# Patient Record
Sex: MALE
[De-identification: names, ages, dates, MRNs, and addresses within clinical notes are randomized per-mention and may not be internally consistent; named-entity substitution may affect disease eponyms.]

---

## 2022-02-28 PROBLEM — Z00.00 ENCOUNTER FOR PREVENTIVE HEALTH EXAMINATION: Status: ACTIVE | Noted: 2022-02-28

## 2022-03-08 RX ORDER — UBIDECARENONE/VIT E ACET 100MG-5
CAPSULE ORAL
Refills: 0 | Status: ACTIVE | COMMUNITY
Start: 2022-03-08

## 2022-03-09 ENCOUNTER — APPOINTMENT (OUTPATIENT)
Dept: BARIATRICS/WEIGHT MGMT | Facility: CLINIC | Age: 32
End: 2022-03-09
Payer: SELF-PAY

## 2022-03-09 VITALS — HEIGHT: 72 IN | WEIGHT: 295 LBS | BODY MASS INDEX: 39.96 KG/M2

## 2022-03-09 DIAGNOSIS — Z83.3 FAMILY HISTORY OF DIABETES MELLITUS: ICD-10-CM

## 2022-03-09 DIAGNOSIS — Z87.2 PERSONAL HISTORY OF DISEASES OF THE SKIN AND SUBCUTANEOUS TISSUE: ICD-10-CM

## 2022-03-09 DIAGNOSIS — Z83.438 FAMILY HISTORY OF OTHER DISORDER OF LIPOPROTEIN METABOLISM AND OTHER LIPIDEMIA: ICD-10-CM

## 2022-03-09 DIAGNOSIS — Z82.49 FAMILY HISTORY OF ISCHEMIC HEART DISEASE AND OTHER DISEASES OF THE CIRCULATORY SYSTEM: ICD-10-CM

## 2022-03-09 PROCEDURE — 99205 OFFICE O/P NEW HI 60 MIN: CPT | Mod: 95

## 2022-04-04 ENCOUNTER — APPOINTMENT (OUTPATIENT)
Dept: BARIATRICS/WEIGHT MGMT | Facility: CLINIC | Age: 32
End: 2022-04-04
Payer: COMMERCIAL

## 2022-04-04 PROCEDURE — 99214 OFFICE O/P EST MOD 30 MIN: CPT | Mod: 95

## 2022-04-04 NOTE — REASON FOR VISIT
[Follow-Up Visit] : a follow-up visit for [Hyperlipidemia] : hyperlipidemia [Hypertension] : hypertension [Obesity] : obesity

## 2022-06-14 ENCOUNTER — NON-APPOINTMENT (OUTPATIENT)
Age: 32
End: 2022-06-14

## 2022-08-29 ENCOUNTER — APPOINTMENT (OUTPATIENT)
Dept: BARIATRICS/WEIGHT MGMT | Facility: CLINIC | Age: 32
End: 2022-08-29

## 2022-08-29 VITALS — WEIGHT: 256 LBS | BODY MASS INDEX: 34.67 KG/M2 | HEIGHT: 72 IN

## 2022-08-29 DIAGNOSIS — E66.9 OBESITY, UNSPECIFIED: ICD-10-CM

## 2022-08-29 PROCEDURE — 99213 OFFICE O/P EST LOW 20 MIN: CPT | Mod: 95

## 2022-08-29 RX ORDER — SEMAGLUTIDE 1.34 MG/ML
2 INJECTION, SOLUTION SUBCUTANEOUS
Qty: 2 | Refills: 0 | Status: COMPLETED | COMMUNITY
Start: 2022-06-27

## 2022-08-29 RX ORDER — SEMAGLUTIDE 1 MG/.5ML
1 INJECTION, SOLUTION SUBCUTANEOUS
Qty: 2 | Refills: 0 | Status: DISCONTINUED | COMMUNITY
Start: 2022-04-27

## 2022-08-29 RX ORDER — SEMAGLUTIDE 1.7 MG/.75ML
1.7 INJECTION, SOLUTION SUBCUTANEOUS
Qty: 3 | Refills: 0 | Status: DISCONTINUED | COMMUNITY
Start: 2022-05-11

## 2022-08-29 RX ORDER — SEMAGLUTIDE 0.5 MG/.5ML
0.5 INJECTION, SOLUTION SUBCUTANEOUS
Qty: 1 | Refills: 0 | Status: DISCONTINUED | COMMUNITY
Start: 2022-03-09 | End: 2022-08-29

## 2022-08-29 RX ORDER — SEMAGLUTIDE 0.25 MG/.5ML
0.25 INJECTION, SOLUTION SUBCUTANEOUS
Qty: 2 | Refills: 0 | Status: DISCONTINUED | COMMUNITY
Start: 2022-03-09

## 2022-08-29 NOTE — PHYSICAL EXAM
[Obese, well nourished, in no acute distress] : obese, well nourished, in no acute distress [de-identified] : TEB appointment

## 2022-08-29 NOTE — HISTORY OF PRESENT ILLNESS
[Home] : at home, [unfilled] , at the time of the visit. [Medical Office: ([facility])___] : at the medical office located in  [Verbal consent obtained from patient] : the patient, [unfilled] [FreeTextEntry1] : 31M PMH class I obesity, HTN, HLD, eczema who presents to weight management for initial evaluation.\par \par He presents referred by his wife, who also started in the clinic recently for weight loss.\par \par Weight/Diet History:\par Notes he was always heavy as a kid. About 10 years ago he decided to try and lose lots of weight by exercising a lot and going to the gym. He was doing aerobic and strength training. Estimates he was 90 kg (198.4 lbs) at that time. He regained weight when he stopped exercising. \par He is currently at his highest weight.\par \par Weight today: 287 lbs, BMI 38.92\par \par Diet: Wakes up at 6:30 am\par B: Whole grain bread with hummus\par L: Chicken with rice, green beans\par D: Sometimes skips. Last night he had jorge side up egg and salad and pastrami\par Dessert: Popsicles\par Snack: AM - banana, liquid yogurt. Chips and popsicles in the evening, perfect bar\par Beverages: Lao coffee with sugar\par Night-time eating: Chips and popsicles\par Fast-food/Restaurants: Minimal\par Cook/Prepare meals:\par Added oils:\par Food Journal: No\par \par Exercise: None at them moment. Active on the job.\par \par Sleep: Snoring, no sleep study. Less when he weighed at 6:30. Goes to bed at 10 pm, and wakes up 6:30 pm\par \par Social Hx: Denies tobacco or drug use. Drinks wine once per week and one beer several nights per week. Works in construction management, [employer]. Lives with wife and infant daughter. \par \par Denies hx of cardiac conditions, GERD, pancreatitis, gallstones, kidney stones, glaucoma, fhx thyroid cancer, or clinical anxiety/depression.

## 2022-08-29 NOTE — ASSESSMENT
[FreeTextEntry1] : 31M PMH obesity, HTN, HLD, eczema who presents to weight management for initial evaluation.\par \par # Obesity c/HTN, HLD: Weight today 287 lbs, BMI 38.92. Longstanding history of weight challenges, lost weight with exercise in the past but regained with sedentary lifestyle.\par - Food log recommended\par - Minimize snacks, particularly after dinner (popsicles, chips), and liquid calories\par - Will further discuss meal timing and whole foods\par - Start regular exercise.\par - Snores, will consider eval for MONIK, although reportedly improved with weight loss. \par - Will discuss beer/alcohol at next visit\par - Wegovy .25 mg/wk prescribed\par - F/u 2-4 weeks\par - Labs and exam at future visit.

## 2022-08-29 NOTE — ASSESSMENT
[FreeTextEntry1] : 31M PMH class I obesity, HTN, HLD, eczema who presents to weight management for initial evaluation.\par \par # Obesity c/b HTN, HLD: Reporting ~9 lb weight loss over the past 3-4 weeks on Wegovy 0.25 mg/wk, with significant improvements to eating habits such as night eating. \par - Rec starting food log\par - C/w improved eating habits\par - Incorporate exercise or activity into day\par - Increase Wegovy to 0.5 mg/wk\par - Follow-up in 4 weeks.

## 2022-08-29 NOTE — HISTORY OF PRESENT ILLNESS
[FreeTextEntry1] : 32M PMH class I obesity, HTN, HLD, eczema who presents to weight management for follow-up.\par \par Weight Today: 256, BMI 34.72\par Weight at Initial Visit (3/9/22): 287 lbs, BMI 38.92 \par \par Medication: He has been on Ozempic 1.0 mg/wk for 2 weeks. No side effects at all.\par Previously he was on Wegovy and at 1.7 mg/wk he experienced severe abdominal pain, went to the ED for imaging and evaluation, nothing was found, spontaneously resolved. We reduced medication to 0.5 mg/wk and monitored, he continued to be asymptomatic since then. Recently increased to 1.0 mg/wk and remains comfortable with no issues. \par \par He feels he is losing lots of weight, and is buying new clothes.\par He has cut out "junk food". Has an apple at breakfast and banana in the evening to get fruit, has salads for vegetables, chicken in the evening for protein. He is not eating after 8 pm. \par Exercises regularly at [employer].

## 2022-08-29 NOTE — PHYSICAL EXAM
[Obese, well nourished, in no acute distress] : obese, well nourished, in no acute distress [Normal] : affect appropriate [de-identified] : TEB appointment

## 2022-08-29 NOTE — HISTORY OF PRESENT ILLNESS
[Home] : at home, [unfilled] , at the time of the visit. [Medical Office: ([facility])___] : at the medical office located in  [Verbal consent obtained from patient] : the patient, [unfilled] [FreeTextEntry1] : 31M PMH class I obesity, HTN, HLD, eczema who presents to weight management for initial evaluation.\par \par Reports weight loss to 280 lbs\par Weight at Initial Visit (3/9/22): 287 lbs, BMI 38.92\par \par Started on Wegovy 0.25 mg at least visit, just took his fourth dose. Tolerating med without side effect or any issue. \par Notes significant reduction in appetite. Has cut out eating in the evening such as when he watches TV.\par Eats very little late in the day, most of what he eats is in the morning. \par Has not started exercising yet.

## 2022-08-29 NOTE — PHYSICAL EXAM
[Obese, well nourished, in no acute distress] : obese, well nourished, in no acute distress [Normal] : affect appropriate [de-identified] : TEB appointment

## 2022-08-29 NOTE — ASSESSMENT
[FreeTextEntry1] : 32M PMH class I obesity, HTN, HLD, eczema who presents to weight management for follow-up.\par \par # Obesity c/b HTN, HLD: 256, BMI 34.72, has lost ~30+ lbs since initial appointment, is tolerating Ozempic 1.0 well. Has been consistent with exercise. \par - Rec starting food log\par - C/w improved eating habits\par - C/w regular exercise\par - C/w Ozempic 1.0 mg/wk for 4 more weeks, then may increase if no side effects. \par - F/u in 6-8 weeks

## 2022-11-28 ENCOUNTER — APPOINTMENT (OUTPATIENT)
Dept: BARIATRICS/WEIGHT MGMT | Facility: CLINIC | Age: 32
End: 2022-11-28

## 2022-11-28 PROCEDURE — 99214 OFFICE O/P EST MOD 30 MIN: CPT | Mod: 95

## 2022-11-29 VITALS — BODY MASS INDEX: 34.27 KG/M2 | HEIGHT: 72 IN | WEIGHT: 253 LBS

## 2022-11-29 NOTE — PHYSICAL EXAM
[Obese, well nourished, in no acute distress] : obese, well nourished, in no acute distress [Normal] : affect appropriate [de-identified] : TEB appointment

## 2022-11-29 NOTE — ASSESSMENT
[FreeTextEntry1] : 32M PMH class I obesity, HTN, HLD, eczema who presents to weight management for follow-up.\par \par # Obesity c/b HTN, HLD: Weight today 253 lbs, BMI 34.31, has lost 3 lbs since last visit, and 34 lbs since starting. He has overall tolerated semaglutide well, but previously experienced a bout of abdominal pain on 2.4 mg/wk, dose temporarily reduced and now experienced a bout of N/V on 2.0 mg/wk. It is unclear whether this episode resulted from the medication given that he'd tolerated it well for weeks and subsequently, however there are now 2 associated episodes. We discussed considering reduction in dose to 1.0 mg/wk, as he has always tolerated doses below 2 mg/wk well, and we may consider adding a low dose of phentermine or alternative agent to achieve similar therapeutic effect. He expresses reluctance to use an additional medication, as he wants to minimize the external medication/substances that he puts in his body, although we did discuss that the amounts of each medication would be lower through dosage, even if the number of medications were increased, and that his might lead to comparable results with less side effects. He feels his symptoms have resolved completely and has not had any issues outside of that episode. \par - Rec starting food log\par - C/w improved eating habits\par - C/w regular exercise\par - Continue to monitor on Ozempic 2.0 mg/wk, f/u within the next 4 weeks. \par - We discussed that should any further episodes of severe nausea or abdominal cramping occur, he should seek immediate medical care, and that we will likely reduce the dose based on the clinical picture at that time. \par - F/u in 6-8 weeks.

## 2022-11-29 NOTE — HISTORY OF PRESENT ILLNESS
[Home] : at home, [unfilled] , at the time of the visit. [Medical Office: ([facility])___] : at the medical office located in  [Verbal consent obtained from patient] : the patient, [unfilled] [FreeTextEntry1] : 32M PMH class I obesity, HTN, HLD, eczema who presents to weight management for follow-up.\par \par Weight Today: 253 lbs, BMI 34.31\par Weight at last visit (8/29/22): 256, BMI 34.72\par Weight at Initial Visit (3/9/22): 287 lbs, BMI 38.92 \par \par Medication: He has been on Ozempic 2.0 mg/wk. Had one episode of nausea, vomited x1, no nausea weeks prior or subsequently. He drank tea the next day and had foul smelling burps. Resolved. \par \par He feels he is losing lots of weight, and is buying new clothes.\par He has cut out "junk food". Has an apple at breakfast and banana in the evening to get fruit, has salads for vegetables, chicken in the evening for protein. He is not eating after 8 pm. \par Exercises regularly at [employer].

## 2022-12-27 RX ORDER — SEMAGLUTIDE 2.68 MG/ML
8 INJECTION, SOLUTION SUBCUTANEOUS
Qty: 3 | Refills: 0 | Status: ACTIVE | COMMUNITY
Start: 2022-06-27 | End: 1900-01-01

## 2023-02-08 ENCOUNTER — APPOINTMENT (OUTPATIENT)
Dept: DERMATOLOGY | Facility: CLINIC | Age: 33
End: 2023-02-08

## 2023-02-23 PROBLEM — I10 HTN (HYPERTENSION): Status: ACTIVE | Noted: 2022-03-08

## 2023-02-23 PROBLEM — E78.5 HLD (HYPERLIPIDEMIA): Status: ACTIVE | Noted: 2022-03-08

## 2023-02-23 PROBLEM — E66.9 OBESITY, CLASS I, BMI 30-34.9: Status: ACTIVE | Noted: 2022-03-09

## 2023-02-24 ENCOUNTER — APPOINTMENT (OUTPATIENT)
Dept: BARIATRICS/WEIGHT MGMT | Facility: CLINIC | Age: 33
End: 2023-02-24
Payer: COMMERCIAL

## 2023-02-24 VITALS — HEIGHT: 72 IN | WEIGHT: 244.7 LBS | BODY MASS INDEX: 33.14 KG/M2

## 2023-02-24 DIAGNOSIS — I10 ESSENTIAL (PRIMARY) HYPERTENSION: ICD-10-CM

## 2023-02-24 DIAGNOSIS — E66.9 OBESITY, UNSPECIFIED: ICD-10-CM

## 2023-02-24 DIAGNOSIS — E78.5 HYPERLIPIDEMIA, UNSPECIFIED: ICD-10-CM

## 2023-02-24 PROCEDURE — 99214 OFFICE O/P EST MOD 30 MIN: CPT | Mod: 95

## 2023-02-24 RX ORDER — PEN NEEDLE, DIABETIC 32 GX 1/6"
32G X 4 MM NEEDLE, DISPOSABLE MISCELLANEOUS
Qty: 12 | Refills: 2 | Status: ACTIVE | COMMUNITY
Start: 2023-02-24 | End: 1900-01-01

## 2023-02-26 NOTE — ASSESSMENT
[FreeTextEntry1] : 32M PMH class I obesity, HTN, HLD, eczema who presents to weight management for follow-up.\par \par # Obesity c/b HTN, HLD: Weight today 244.7 lbs, BMI 33.19, has lost an additional 6 lbs since last visit, and a total of about 42 lbs since initial visit. He has been doing well on Semaglutide, some intermittent abdominal pains. He would like to come off the medication, inquires about sudden cessation vs taper. \par - May benefit from using food log and being mindful of portions and food choices, now that he will not have satiety signaling of medication. \par - C/w improved eating habits\par - C/w regular exercise, stressed importance to maintain exercise for maintained weight loss.\par - Although he may safely discontinue semaglutide from current dose 2.0 mg/wk, we discussed that he may alternatively consider self-administering 1 mg/wk, lasting more weeks, and giving himself a chance to try holding off weight regain at a more challenging dose before trying to come off completely. \par - F/u as needed.

## 2023-02-26 NOTE — HISTORY OF PRESENT ILLNESS
[Home] : at home, [unfilled] , at the time of the visit. [Medical Office: ([facility])___] : at the medical office located in  [Verbal consent obtained from patient] : the patient, [unfilled] [FreeTextEntry1] : 32M PMH class I obesity, HTN, HLD, eczema who presents to weight management for follow-up.\par \par Weight Today: 244.7 lbs, BMI 33.19\par Weight at last visit (11/28/22): 253 lbs, BMI 34.31\par Weight at Initial Visit (3/9/22): 287 lbs, BMI 38.92 \par \par Medication: He has been on Ozempic 2.0 mg/wk. Has some occasional pains in mid abdomen.\par \par He is happy with his weight loss he wants to maintain his weight more naturally.\par We discussed the importance of more attention to food quality, portions (maegan when eating kcal dense foods) that would correspond with his satiation on the medication, maintaining habits of eating schedule and prep, and maintaining exercise.

## 2024-12-04 ENCOUNTER — APPOINTMENT (OUTPATIENT)
Dept: BARIATRICS/WEIGHT MGMT | Facility: CLINIC | Age: 34
End: 2024-12-04
Payer: COMMERCIAL

## 2024-12-05 ENCOUNTER — APPOINTMENT (OUTPATIENT)
Dept: BARIATRICS/WEIGHT MGMT | Facility: CLINIC | Age: 34
End: 2024-12-05
Payer: COMMERCIAL

## 2024-12-05 VITALS — WEIGHT: 276 LBS | HEIGHT: 72 IN | BODY MASS INDEX: 37.38 KG/M2

## 2024-12-05 DIAGNOSIS — E66.812 OBESITY, CLASS 2: ICD-10-CM

## 2024-12-05 DIAGNOSIS — I10 ESSENTIAL (PRIMARY) HYPERTENSION: ICD-10-CM

## 2024-12-05 DIAGNOSIS — E78.5 HYPERLIPIDEMIA, UNSPECIFIED: ICD-10-CM

## 2024-12-05 DIAGNOSIS — E66.811 OBESITY, CLASS 1: ICD-10-CM

## 2024-12-05 PROCEDURE — 99214 OFFICE O/P EST MOD 30 MIN: CPT

## 2024-12-05 RX ORDER — SEMAGLUTIDE 0.68 MG/ML
2 INJECTION, SOLUTION SUBCUTANEOUS
Qty: 1 | Refills: 1 | Status: ACTIVE | COMMUNITY
Start: 2024-12-05 | End: 1900-01-01

## 2024-12-05 RX ORDER — SEMAGLUTIDE 0.25 MG/.5ML
0.25 INJECTION, SOLUTION SUBCUTANEOUS
Qty: 1 | Refills: 1 | Status: ACTIVE | COMMUNITY
Start: 2024-12-05 | End: 1900-01-01

## 2025-09-17 ENCOUNTER — NON-APPOINTMENT (OUTPATIENT)
Age: 35
End: 2025-09-17

## 2025-09-19 ENCOUNTER — APPOINTMENT (OUTPATIENT)
Dept: BARIATRICS/WEIGHT MGMT | Facility: CLINIC | Age: 35
End: 2025-09-19
Payer: COMMERCIAL

## 2025-09-19 VITALS — HEIGHT: 72 IN | WEIGHT: 284 LBS | BODY MASS INDEX: 38.47 KG/M2

## 2025-09-19 DIAGNOSIS — E66.812 OBESITY, CLASS 2: ICD-10-CM

## 2025-09-19 DIAGNOSIS — E78.5 HYPERLIPIDEMIA, UNSPECIFIED: ICD-10-CM

## 2025-09-19 DIAGNOSIS — E66.811 OBESITY, CLASS 1: ICD-10-CM

## 2025-09-19 DIAGNOSIS — I10 ESSENTIAL (PRIMARY) HYPERTENSION: ICD-10-CM

## 2025-09-19 PROCEDURE — G2211 COMPLEX E/M VISIT ADD ON: CPT | Mod: 95

## 2025-09-19 PROCEDURE — 99214 OFFICE O/P EST MOD 30 MIN: CPT | Mod: 95

## 2025-09-19 RX ORDER — TIRZEPATIDE 5 MG/.5ML
5 INJECTION, SOLUTION SUBCUTANEOUS
Qty: 2 | Refills: 0 | Status: ACTIVE | COMMUNITY
Start: 2025-09-19 | End: 1900-01-01